# Patient Record
Sex: FEMALE | Race: WHITE | Employment: OTHER | URBAN - METROPOLITAN AREA
[De-identification: names, ages, dates, MRNs, and addresses within clinical notes are randomized per-mention and may not be internally consistent; named-entity substitution may affect disease eponyms.]

---

## 2017-07-17 ENCOUNTER — HOSPITAL ENCOUNTER (OUTPATIENT)
Dept: GENERAL RADIOLOGY | Age: 82
Discharge: HOME | End: 2017-07-17
Attending: INTERNAL MEDICINE | Admitting: INTERNAL MEDICINE

## 2017-07-17 DIAGNOSIS — M81.0 AGE-RELATED OSTEOPOROSIS WITHOUT CURRENT PATHOLOGICAL FRACTURE: ICD-10-CM

## 2017-07-17 DIAGNOSIS — M81.0 OSTEOPOROSIS, UNSPECIFIED: ICD-10-CM

## 2019-06-12 ENCOUNTER — PROCEDURE VISIT (OUTPATIENT)
Dept: AUDIOLOGY | Age: 84
End: 2019-06-12
Payer: MEDICARE

## 2019-06-12 ENCOUNTER — OFFICE VISIT (OUTPATIENT)
Dept: ENT CLINIC | Age: 84
End: 2019-06-12
Payer: MEDICARE

## 2019-06-12 VITALS — DIASTOLIC BLOOD PRESSURE: 76 MMHG | HEART RATE: 78 BPM | SYSTOLIC BLOOD PRESSURE: 138 MMHG | OXYGEN SATURATION: 97 %

## 2019-06-12 DIAGNOSIS — H90.3 SENSORINEURAL HEARING LOSS (SNHL) OF BOTH EARS: Primary | ICD-10-CM

## 2019-06-12 DIAGNOSIS — R42 DIZZINESS: Primary | ICD-10-CM

## 2019-06-12 PROCEDURE — 1123F ACP DISCUSS/DSCN MKR DOCD: CPT | Performed by: OTOLARYNGOLOGY

## 2019-06-12 PROCEDURE — G8421 BMI NOT CALCULATED: HCPCS | Performed by: OTOLARYNGOLOGY

## 2019-06-12 PROCEDURE — 1036F TOBACCO NON-USER: CPT | Performed by: OTOLARYNGOLOGY

## 2019-06-12 PROCEDURE — 4040F PNEUMOC VAC/ADMIN/RCVD: CPT | Performed by: OTOLARYNGOLOGY

## 2019-06-12 PROCEDURE — 99203 OFFICE O/P NEW LOW 30 MIN: CPT | Performed by: OTOLARYNGOLOGY

## 2019-06-12 PROCEDURE — 92557 COMPREHENSIVE HEARING TEST: CPT | Performed by: AUDIOLOGIST

## 2019-06-12 PROCEDURE — G8427 DOCREV CUR MEDS BY ELIG CLIN: HCPCS | Performed by: OTOLARYNGOLOGY

## 2019-06-12 PROCEDURE — 1090F PRES/ABSN URINE INCON ASSESS: CPT | Performed by: OTOLARYNGOLOGY

## 2019-06-12 PROCEDURE — G8399 PT W/DXA RESULTS DOCUMENT: HCPCS | Performed by: OTOLARYNGOLOGY

## 2019-06-12 RX ORDER — ASPIRIN 81 MG/1
81 TABLET, CHEWABLE ORAL DAILY
COMMUNITY

## 2019-06-12 RX ORDER — M-VIT,TX,IRON,MINS/CALC/FOLIC 27MG-0.4MG
1 TABLET ORAL DAILY
COMMUNITY

## 2019-06-12 RX ORDER — LOSARTAN POTASSIUM 100 MG/1
TABLET ORAL
Refills: 1 | COMMUNITY
Start: 2019-05-18

## 2019-06-12 RX ORDER — FENOFIBRATE 160 MG/1
TABLET ORAL
COMMUNITY
Start: 2017-06-19

## 2019-06-12 RX ORDER — MULTIVITAMIN WITH IRON
100 TABLET ORAL DAILY
COMMUNITY

## 2019-06-12 RX ORDER — SIMVASTATIN 40 MG
TABLET ORAL
Refills: 1 | COMMUNITY
Start: 2019-05-18

## 2019-06-12 NOTE — PROGRESS NOTES
The patient complains of a lightheaded sensation when she rises from a sitting position. This is been present for several weeks. She expressed concern that her ear may be involved given an episode a year ago where there was out of any following URI. There is been no vertigo either subjective or objective. There are no positions that are problematic other than rising. There is a lightheaded woozy sensation that passes after a few seconds and she is able to continue. The symptoms appear worse when she first arises in the morning. By late in the afternoon she is generally asymptomatic  There is been no ringing in the ears, notable change in hearing, otalgia, otorrhea. Medications for dizziness have not been efficacious   She denies any recent respiratory infections or dental disease. There is been no ear trauma, head trauma, barotrauma    Her other medications include Zocor, Cozaar, omeprazole, vitamin B6, aspirin    There are no known allergies    Family history is noncontributory    Social history: The patient is a non smoker and is not exposed to second hand smoke or irritants. There have been no known contagious contacts. She still feels comfortable driving    Review of systems  There are  no other symptoms referable to the upper aerodigestive tract. There is a recent finding of a \"leaky heart\"    Exam:    The patient appears well developed and well nourished.; oriented to person, place, and time. The head is normocephalic and atraumatic; no facial scars or weakness are noted. The facial skeleton is normal.The voice has a normal quality and tonality to it. Eyes: Conjunctivae and lids normal. Full EOM. The skin is warm and dry. No pallor. The pinnae are normal in appearance. Otoscopy reveals clear ear canals without cerumen or keratin debris. Both eardrums are normal with good aeration of the middle ear space. There is no attic retraction, and a normal light reflex.      Tuning fork tests: Rinne to in midline at 256 cps; Gutierrez shows air conduction greater than bone on right and air conduction greater than bone conduction on left at 512 cps  Nasal exam is unremarkable with respect to the turbinates and septum  No nystagmus in the horizontal or vertical plane, either spontaneous or elicited  Cerebellar function testing is normal for past-pointing and rapid alternating movements  Cranial nerves are intact  Salivary glands: The parotid and submandibular glands are normal in appearance. There are no palpable stones or masses. Clear secretions emanate from both Jason's and Prabhu's ducts. There is no periglandular adenopathy. Chest reveals normal effort, no stridor. No tachypnea. No respiratory distress  Psychiatric: There is a normal mood and affect. Behavior is normal. Judgment and thought content normal.   Audiometry shows a moderately severe loss with a small air-bone gap of the thousand cycles per second    Impression:  Subjective and objective data at hand do not suggest the labyrinth as the etiology for her disequilibrium. The Best-Hallpike maneuver was not carried out given the absence of any positional symptoms    Plan:   It was suggested that she pursue a cardiovascular work-up at this time  Fall prevention was discussed

## 2019-06-14 NOTE — PROGRESS NOTES
Subjective:      Patient ID: Jose Pantoja is a 82 y.o. female seen for hearing evaluation. Patient referred by ENT. Hearing evaluation ranges from marginal, sloping to moderately severe. Loss is of sensorineural origin, with good speech reception from both sides. Re: return for hearing aid consultation  ROBERTO Griffiths

## 2020-02-22 ENCOUNTER — HOSPITAL ENCOUNTER (OUTPATIENT)
Dept: GENERAL RADIOLOGY | Age: 85
Discharge: HOME | End: 2020-02-22
Payer: MEDICARE

## 2020-02-22 ENCOUNTER — HOSPITAL ENCOUNTER (OUTPATIENT)
Age: 85
Discharge: HOME | End: 2020-02-22
Payer: MEDICARE

## 2020-02-22 PROCEDURE — 73502 X-RAY EXAM HIP UNI 2-3 VIEWS: CPT

## 2020-02-22 PROCEDURE — 72100 X-RAY EXAM L-S SPINE 2/3 VWS: CPT

## 2020-02-22 PROCEDURE — 71046 X-RAY EXAM CHEST 2 VIEWS: CPT

## 2020-02-28 ENCOUNTER — HOSPITAL ENCOUNTER (OUTPATIENT)
Dept: NEUROLOGY | Age: 85
Discharge: HOME | End: 2020-02-28
Payer: MEDICARE

## 2020-02-28 PROCEDURE — 93225 XTRNL ECG REC<48 HRS REC: CPT

## 2020-02-28 PROCEDURE — 93226 XTRNL ECG REC<48 HR SCAN A/R: CPT

## 2020-03-04 LAB
ACQUISITION DURATION: NORMAL S
AVERAGE HEART RATE: 73 BPM
EKG DIAGNOSIS: NORMAL
FASTEST SUPRAVENTRICULAR RATE: 141 BPM
HOLTER MAX HEART RATE: 108 BPM
HOOKUP DATE: NORMAL
HOOKUP TIME: NORMAL
LONGEST SUPRAVENTRICULAR RATE: 141 BPM
MAX HEART RATE TIME/DATE: NORMAL
MIN HEART RATE TIME/DATE: NORMAL
MIN HEART RATE: 45 BPM
NUMBER OF FASTEST SUPRAVENTRICULAR BEATS: 14
NUMBER OF LONGEST SUPRAVENTRICULAR BEATS: 14
NUMBER OF QRS COMPLEXES: NORMAL
NUMBER OF SUPRAVENTRICULAR BEATS IN RUNS: 34
NUMBER OF SUPRAVENTRICULAR COUPLETS: 3
NUMBER OF SUPRAVENTRICULAR ECTOPICS: 55
NUMBER OF SUPRAVENTRICULAR ISOLATED BEATS: 15
NUMBER OF SUPRAVENTRICULAR RUNS: 5
NUMBER OF VENTRICULAR BEATS IN RUNS: 0
NUMBER OF VENTRICULAR BIGEMINAL CYCLES: 59
NUMBER OF VENTRICULAR COUPLETS: 1
NUMBER OF VENTRICULAR ECTOPICS: 202
NUMBER OF VENTRICULAR ISOLATED BEATS: 200
NUMBER OF VENTRICULAR RUNS: 0

## 2021-08-05 ENCOUNTER — HOSPITAL ENCOUNTER (OUTPATIENT)
Dept: VASCULAR LAB | Age: 86
Discharge: HOME | End: 2021-08-05
Payer: MEDICARE

## 2021-08-05 DIAGNOSIS — M79.89 LEFT LEG SWELLING: ICD-10-CM

## 2021-08-05 PROCEDURE — 93971 EXTREMITY STUDY: CPT

## 2024-07-15 ENCOUNTER — PROCEDURE VISIT (OUTPATIENT)
Dept: AUDIOLOGY | Age: 89
End: 2024-07-15
Payer: MEDICARE

## 2024-07-15 ENCOUNTER — OFFICE VISIT (OUTPATIENT)
Dept: ENT CLINIC | Age: 89
End: 2024-07-15
Payer: MEDICARE

## 2024-07-15 VITALS
HEIGHT: 59 IN | HEART RATE: 65 BPM | BODY MASS INDEX: 22.58 KG/M2 | DIASTOLIC BLOOD PRESSURE: 75 MMHG | WEIGHT: 112 LBS | SYSTOLIC BLOOD PRESSURE: 133 MMHG | TEMPERATURE: 97.8 F

## 2024-07-15 DIAGNOSIS — H90.3 SENSORINEURAL HEARING LOSS (SNHL) OF BOTH EARS: Primary | ICD-10-CM

## 2024-07-15 DIAGNOSIS — H90.3 BILATERAL SENSORINEURAL HEARING LOSS: Primary | Chronic | ICD-10-CM

## 2024-07-15 DIAGNOSIS — H93.8X3 PRESSURE SENSATION IN BOTH EARS: ICD-10-CM

## 2024-07-15 PROCEDURE — 99203 OFFICE O/P NEW LOW 30 MIN: CPT | Performed by: OTOLARYNGOLOGY

## 2024-07-15 PROCEDURE — G8427 DOCREV CUR MEDS BY ELIG CLIN: HCPCS | Performed by: OTOLARYNGOLOGY

## 2024-07-15 PROCEDURE — 1090F PRES/ABSN URINE INCON ASSESS: CPT | Performed by: OTOLARYNGOLOGY

## 2024-07-15 PROCEDURE — G8420 CALC BMI NORM PARAMETERS: HCPCS | Performed by: OTOLARYNGOLOGY

## 2024-07-15 PROCEDURE — 92567 TYMPANOMETRY: CPT | Performed by: AUDIOLOGIST

## 2024-07-15 PROCEDURE — 1123F ACP DISCUSS/DSCN MKR DOCD: CPT | Performed by: OTOLARYNGOLOGY

## 2024-07-15 PROCEDURE — 1036F TOBACCO NON-USER: CPT | Performed by: OTOLARYNGOLOGY

## 2024-07-15 PROCEDURE — 92557 COMPREHENSIVE HEARING TEST: CPT | Performed by: AUDIOLOGIST

## 2024-07-15 NOTE — PATIENT INSTRUCTIONS
You should consider amplification therapy, hearing aid, if you are having difficulty communicating and/or hearing important sounds.  I recommend bilateral hearing aids for aural rehabilitation and to aid in restoring functional hearing.  You may follow up with the [facility] audiologist or with another hearing aid provider of your choice.  You should return for an annual hearing test to monitor your hearing, and whenever your hearing further decreases significantly.    You should return if your ears plug up with ear wax causing difficulty hearing or pressure.  Most patients who use hearing aids, will need their ears cleaned every 6 to 12 months.  You should employ the tinnitus masking techniques and strategies we discussed, as needed.  Bedside tinnitus masking devices (eg. a white noise machine, noise machine, noise neo on your cell phone, fan on in the room, radio with light music or tuned between stations to white noise static) can be very helpful.  You should avoid exposure to excessively high levels of noise/sound and use hearing protection measures we discussed, as needed, if such exposure is unavoidable.  NO Q-TIPS IN THE EARS  You should never clean your ears with a Q-tip, cotton tipped applicator, Sil pin, paper clip, or any other instrument.  I recommend only use of one the several ear wax removal kits available \"over the counter\" (eg. [employer] or Murine, or [employer]) if you feel a need to try to remove ear wax.  No other methods should be self used for this purpose as there is danger of injury to the ear and risk of irreparable and irreversible permanent hearing loss.

## 2024-07-15 NOTE — PROGRESS NOTES
Betsey Fry   1935, 89 y.o. female   [id number]       Referring Provider: Tello Cope MD  Referral Type: Referring provider encounter note    Reason for Visit: Evaluation of suspected change in hearing, tinnitus, or balance.      ADULT AUDIOLOGIC EVALUATION      Betsey Fry is a 89 y.o. female seen today, 7/15/2024, for an initial audiologic evaluation.      AUDIOLOGIC AND OTHER PERTINENT MEDICAL HISTORY:        Betsey Fry noted decreased hearing bilaterally, gradual, difficulty understanding what others are saying, especially noticeable after having COVID in 2020; concern for fullness in her ears, also noticeable since 2020; has had history of cerumen build-up, most recent was an ear cleaning with PCP in February 2024.      Betsey Fry denied otalgia, otorrhea, tinnitus, dizziness, history of occupational/recreational noise exposure, history of ear surgery, and family history of hearing loss.    IMPRESSIONS:       Today's results are consistent with bilateral sensorineural hearing loss with normal middle ar function and good word recognition for both ears.  Hearing loss is significant enough to result in difficulty understanding speech in most listening environments.  Recommended hearing aid evaluation; follow medical recommendations from Dr. Cope.    ASSESSMENT AND FINDINGS:       Otoscopy revealed: Clear ear canals bilaterally      RIGHT EAR:  Hearing Sensitivity: Mild to severe sensorineural hearing loss.  Speech Recognition Threshold: 50 dBHL  Word Recognition: Good (84%), based on NU-6 25-word list at 75 dBHL using recorded speech stimuli.    Tympanometry: Normal peak pressure and compliance, Type A tympanogram, consistent with normal middle ear function.      LEFT EAR:  Hearing Sensitivity: Mild to severe sensorineural hearing loss.  Speech Recognition Threshold: 50 dBHL  Word Recognition: Good (80%), based on NU-6 25-word list at 75 dBHL using recorded speech stimuli.    Tympanometry:

## 2024-07-15 NOTE — PROGRESS NOTES
to try to remove ear wax.  No other methods should be self used for this purpose as there is danger of injury to the ear and risk of irreparable and irreversible permanent hearing loss.         Tello Cope MD    [facility]  Otolaryngology/Head and Neck Surgery  [facility]  [address], [address]  [place], [place] [address] [phone]

## 2024-07-20 ENCOUNTER — HOSPITAL ENCOUNTER (OUTPATIENT)
Age: 89
Discharge: HOME | End: 2024-07-20

## 2024-07-21 ENCOUNTER — TRANSCRIBE ORDERS (OUTPATIENT)
Dept: ADMINISTRATIVE | Age: 89
End: 2024-07-21

## 2024-07-21 DIAGNOSIS — N95.9 MENOPAUSAL AND PERIMENOPAUSAL DISORDER: ICD-10-CM

## 2024-07-21 DIAGNOSIS — Z00.00 MEDICARE ANNUAL WELLNESS VISIT, SUBSEQUENT: Primary | ICD-10-CM

## 2024-07-21 LAB
25(OH)D3 SERPL-MCNC: 40.1 NG/ML
ALBUMIN SERPL-MCNC: 4.2 G/DL (ref 3.4–5)
ALBUMIN/GLOB SERPL: 1.3 {RATIO} (ref 1.1–2.2)
ALP SERPL-CCNC: 80 U/L (ref 40–129)
ALT SERPL-CCNC: 17 U/L (ref 10–40)
ANION GAP SERPL CALCULATED.3IONS-SCNC: 11 MMOL/L (ref 3–16)
AST SERPL-CCNC: 31 U/L (ref 15–37)
BILIRUB SERPL-MCNC: 0.4 MG/DL (ref 0–1)
BUN SERPL-MCNC: 15 MG/DL (ref 7–20)
CALCIUM SERPL-MCNC: 9.9 MG/DL (ref 8.3–10.6)
CHLORIDE SERPL-SCNC: 103 MMOL/L (ref 99–110)
CHOLEST SERPL-MCNC: 159 MG/DL (ref 0–199)
CO2 SERPL-SCNC: 26 MMOL/L (ref 21–32)
CREAT SERPL-MCNC: 0.7 MG/DL (ref 0.6–1.2)
EST. AVERAGE GLUCOSE BLD GHB EST-MCNC: 111.2 MG/DL
GFR SERPLBLD CREATININE-BSD FMLA CKD-EPI: 83 ML/MIN/{1.73_M2}
GLUCOSE SERPL-MCNC: 107 MG/DL (ref 70–99)
HBA1C MFR BLD: 5.5 %
HDLC SERPL-MCNC: 64 MG/DL (ref 40–60)
LDLC SERPL CALC-MCNC: 64 MG/DL
POTASSIUM SERPL-SCNC: 4.3 MMOL/L (ref 3.5–5.1)
PROT SERPL-MCNC: 7.4 G/DL (ref 6.4–8.2)
SODIUM SERPL-SCNC: 140 MMOL/L (ref 136–145)
TRIGL SERPL-MCNC: 157 MG/DL (ref 0–150)
TSH SERPL DL<=0.005 MIU/L-ACNC: 2.66 UIU/ML (ref 0.27–4.2)
VLDLC SERPL CALC-MCNC: 31 MG/DL

## 2024-09-03 ENCOUNTER — APPOINTMENT (OUTPATIENT)
Dept: GENERAL RADIOLOGY | Age: 89
End: 2024-09-03
Payer: MEDICARE

## 2024-09-03 ENCOUNTER — HOSPITAL ENCOUNTER (EMERGENCY)
Age: 89
Discharge: LEFT BEFORE DISCHARGE | End: 2024-09-03
Attending: EMERGENCY MEDICINE
Payer: MEDICARE

## 2024-09-03 VITALS
BODY MASS INDEX: 22.56 KG/M2 | OXYGEN SATURATION: 98 % | TEMPERATURE: 97.9 F | DIASTOLIC BLOOD PRESSURE: 79 MMHG | SYSTOLIC BLOOD PRESSURE: 161 MMHG | HEART RATE: 67 BPM | HEIGHT: 59 IN | WEIGHT: 111.9 LBS | RESPIRATION RATE: 18 BRPM

## 2024-09-03 DIAGNOSIS — R07.9 CHEST PAIN, UNSPECIFIED TYPE: Primary | ICD-10-CM

## 2024-09-03 LAB
ALBUMIN SERPL-MCNC: 4.1 G/DL (ref 3.4–5)
ALBUMIN/GLOB SERPL: 1.3 {RATIO} (ref 1.1–2.2)
ALP SERPL-CCNC: 80 U/L (ref 40–129)
ALT SERPL-CCNC: 13 U/L (ref 10–40)
ANION GAP SERPL CALCULATED.3IONS-SCNC: 15 MMOL/L (ref 3–16)
AST SERPL-CCNC: 26 U/L (ref 15–37)
BASOPHILS # BLD: 0 K/UL (ref 0–0.2)
BASOPHILS NFR BLD: 0.6 %
BILIRUB SERPL-MCNC: 0.4 MG/DL (ref 0–1)
BUN SERPL-MCNC: 9 MG/DL (ref 7–20)
CALCIUM SERPL-MCNC: 9.5 MG/DL (ref 8.3–10.6)
CHLORIDE SERPL-SCNC: 102 MMOL/L (ref 99–110)
CO2 SERPL-SCNC: 22 MMOL/L (ref 21–32)
CREAT SERPL-MCNC: 0.6 MG/DL (ref 0.6–1.2)
DEPRECATED RDW RBC AUTO: 13.5 % (ref 12.4–15.4)
EOSINOPHIL # BLD: 0 K/UL (ref 0–0.6)
EOSINOPHIL NFR BLD: 0.4 %
GFR SERPLBLD CREATININE-BSD FMLA CKD-EPI: 86 ML/MIN/{1.73_M2}
GLUCOSE SERPL-MCNC: 131 MG/DL (ref 70–99)
HCT VFR BLD AUTO: 37.6 % (ref 36–48)
HGB BLD-MCNC: 12.8 G/DL (ref 12–16)
LIPASE SERPL-CCNC: 66 U/L (ref 13–60)
LYMPHOCYTES # BLD: 2 K/UL (ref 1–5.1)
LYMPHOCYTES NFR BLD: 26.8 %
MCH RBC QN AUTO: 31.6 PG (ref 26–34)
MCHC RBC AUTO-ENTMCNC: 33.9 G/DL (ref 31–36)
MCV RBC AUTO: 93 FL (ref 80–100)
MONOCYTES # BLD: 0.5 K/UL (ref 0–1.3)
MONOCYTES NFR BLD: 6.5 %
NEUTROPHILS # BLD: 5 K/UL (ref 1.7–7.7)
NEUTROPHILS NFR BLD: 65.7 %
PLATELET # BLD AUTO: 231 K/UL (ref 135–450)
PMV BLD AUTO: 7.4 FL (ref 5–10.5)
POTASSIUM SERPL-SCNC: 4 MMOL/L (ref 3.5–5.1)
PROT SERPL-MCNC: 7.3 G/DL (ref 6.4–8.2)
RBC # BLD AUTO: 4.04 M/UL (ref 4–5.2)
SODIUM SERPL-SCNC: 139 MMOL/L (ref 136–145)
TROPONIN, HIGH SENSITIVITY: 17 NG/L (ref 0–14)
TROPONIN, HIGH SENSITIVITY: 19 NG/L (ref 0–14)
WBC # BLD AUTO: 7.6 K/UL (ref 4–11)

## 2024-09-03 PROCEDURE — 6370000000 HC RX 637 (ALT 250 FOR IP): Performed by: EMERGENCY MEDICINE

## 2024-09-03 PROCEDURE — 85025 COMPLETE CBC W/AUTO DIFF WBC: CPT

## 2024-09-03 PROCEDURE — 71045 X-RAY EXAM CHEST 1 VIEW: CPT

## 2024-09-03 PROCEDURE — 80053 COMPREHEN METABOLIC PANEL: CPT

## 2024-09-03 PROCEDURE — 99285 EMERGENCY DEPT VISIT HI MDM: CPT

## 2024-09-03 PROCEDURE — 84484 ASSAY OF TROPONIN QUANT: CPT

## 2024-09-03 PROCEDURE — 93005 ELECTROCARDIOGRAM TRACING: CPT | Performed by: EMERGENCY MEDICINE

## 2024-09-03 PROCEDURE — 83690 ASSAY OF LIPASE: CPT

## 2024-09-03 RX ADMIN — LIDOCAINE HYDROCHLORIDE: 20 SOLUTION ORAL at 12:50

## 2024-09-03 ASSESSMENT — HEART SCORE
ECG: NON-SPECIFC REPOLARIZATION DISTURBANCE/LBTB/PM
ECG: NON-SPECIFC REPOLARIZATION DISTURBANCE/LBTB/PM

## 2024-09-03 ASSESSMENT — LIFESTYLE VARIABLES
HOW MANY STANDARD DRINKS CONTAINING ALCOHOL DO YOU HAVE ON A TYPICAL DAY: PATIENT DOES NOT DRINK
HOW OFTEN DO YOU HAVE A DRINK CONTAINING ALCOHOL: NEVER

## 2024-09-03 ASSESSMENT — PAIN DESCRIPTION - LOCATION: LOCATION: CHEST

## 2024-09-03 ASSESSMENT — PAIN SCALES - GENERAL: PAINLEVEL_OUTOF10: 5

## 2024-09-03 ASSESSMENT — PAIN - FUNCTIONAL ASSESSMENT: PAIN_FUNCTIONAL_ASSESSMENT: 0-10

## 2024-09-03 NOTE — ED NOTES
This RN at bedside and pt told this RN \"You can tell the doctor I don't want to stay because I'm not going to be admitted.\" Dr. Hamilton at bedside.

## 2024-09-03 NOTE — ED PROVIDER NOTES
that she would allow and we encouraged her to return to the hospital for care at any time.  She does state she will follow up with her PCP for further investigation.    Heart Score for chest pain patients  History: Slightly Suspicious  ECG: Non-Specifc repolarization disturbance/LBTB/PM  Patient Age: > 65 years  *Risk factors for Atherosclerotic disease: Positive family History, Hypercholesterolemia  Risk Factors: 1 or 2 risk factors  Troponin: > 1 and < 3X normal limit  Heart Score Total: 5    Differential Diagnosis: ACS, pneumonia, pneumothorax, PE, aortic dissection, myocarditis, pericarditis, other    FOLLOW UP:    Kathleen Collins, APRN - [facility]  [address] [place] [facility] [address]  [phone]    Schedule an appointment as soon as possible for a visit       [facility]  [address] [place] [address]  [phone]  Go to   If symptoms worsen    FINAL IMPRESSION:    1. Chest pain, unspecified type      DISPOSITION [place] 09/03/2024 02:37:54 PM  Condition at Disposition: Data Unavailable    (Please note that I used voice recognition software to generate this note.  Occasionally words are mistranscribed despite my efforts to edit errors.)       Marge Hamilton MD  09/03/24 1393

## 2024-09-04 LAB
EKG ATRIAL RATE: 69 BPM
EKG DIAGNOSIS: NORMAL
EKG P AXIS: -15 DEGREES
EKG P-R INTERVAL: 174 MS
EKG Q-T INTERVAL: 428 MS
EKG QRS DURATION: 78 MS
EKG QTC CALCULATION (BAZETT): 458 MS
EKG R AXIS: -33 DEGREES
EKG T AXIS: 4 DEGREES
EKG VENTRICULAR RATE: 69 BPM

## 2024-09-04 PROCEDURE — 93010 ELECTROCARDIOGRAM REPORT: CPT | Performed by: INTERNAL MEDICINE

## 2024-09-05 ENCOUNTER — HOSPITAL ENCOUNTER (OUTPATIENT)
Dept: VASCULAR LAB | Age: 89
Discharge: HOME | End: 2024-09-07
Payer: MEDICARE

## 2024-09-05 DIAGNOSIS — M79.604 PAIN IN BOTH LOWER EXTREMITIES: ICD-10-CM

## 2024-09-05 DIAGNOSIS — I73.9 PERIPHERAL VASCULAR DISEASE, UNSPECIFIED (HCC): ICD-10-CM

## 2024-09-05 DIAGNOSIS — M79.605 PAIN IN BOTH LOWER EXTREMITIES: ICD-10-CM

## 2024-09-05 LAB
VAS LEFT ABI: 1.06
VAS LEFT ANKLE BP: 180 MMHG
VAS LEFT ARM BP: 166 MMHG
VAS LEFT ATA DIST PSV: 63.2 CM/S
VAS LEFT ATA PROX PSV: 75.5 CM/S
VAS LEFT CFA DIST PSV: 114 CM/S
VAS LEFT CFA PROX PSV: 143 CM/S
VAS LEFT DORSALIS PEDIS BP: 164 MMHG
VAS LEFT PERONEAL DIST PSV: 33.2 CM/S
VAS LEFT PERONEAL PROX PSV: 37.8 CM/S
VAS LEFT PFA PROX PSV: 131 CM/S
VAS LEFT POP A DIST PSV: 64.8 CM/S
VAS LEFT POP A PROX PSV: 71.4 CM/S
VAS LEFT POP A PROX VEL RATIO: 0.78
VAS LEFT PTA BP: 180 MMHG
VAS LEFT PTA DIST PSV: 50.6 CM/S
VAS LEFT PTA PROX PSV: 74.5 CM/S
VAS LEFT SFA DIST PSV: 91.9 CM/S
VAS LEFT SFA DIST VEL RATIO: 1.37
VAS LEFT SFA MID PSV: 67.1 CM/S
VAS LEFT SFA MID VEL RATIO: 0.76
VAS LEFT SFA PROX PSV: 87.8 CM/S
VAS LEFT SFA PROX VEL RATIO: 0.61
VAS RIGHT ABI: 1.11
VAS RIGHT ANKLE BP: 188 MMHG
VAS RIGHT ARM BP: 170 MMHG
VAS RIGHT ATA DIST PSV: 44.3 CM/S
VAS RIGHT ATA PROX PSV: 61.5 CM/S
VAS RIGHT CFA DIST PSV: 82.3 CM/S
VAS RIGHT CFA PROX PSV: 115 CM/S
VAS RIGHT DORSALIS PEDIS BP: 168 MMHG
VAS RIGHT PERONEAL DIST PSV: 27 CM/S
VAS RIGHT PERONEAL PROX PSV: 46.8 CM/S
VAS RIGHT PFA PROX PSV: 55.3 CM/S
VAS RIGHT POP A DIST PSV: 91.3 CM/S
VAS RIGHT POP A PROX PSV: 94.4 CM/S
VAS RIGHT POP A PROX VEL RATIO: 1.21
VAS RIGHT PTA BP: 188 MMHG
VAS RIGHT PTA DIST PSV: 60.9 CM/S
VAS RIGHT PTA PROX PSV: 66.5 CM/S
VAS RIGHT SFA DIST PSV: 78.3 CM/S
VAS RIGHT SFA DIST VEL RATIO: 0.91
VAS RIGHT SFA MID PSV: 86.4 CM/S
VAS RIGHT SFA MID VEL RATIO: 0.9
VAS RIGHT SFA PROX PSV: 97.5 CM/S
VAS RIGHT SFA PROX VEL RATIO: 0.8

## 2024-09-05 PROCEDURE — 93925 LOWER EXTREMITY STUDY: CPT | Performed by: INTERNAL MEDICINE

## 2024-09-05 PROCEDURE — 93925 LOWER EXTREMITY STUDY: CPT
